# Patient Record
Sex: MALE | Race: WHITE | NOT HISPANIC OR LATINO | Employment: OTHER | URBAN - METROPOLITAN AREA
[De-identification: names, ages, dates, MRNs, and addresses within clinical notes are randomized per-mention and may not be internally consistent; named-entity substitution may affect disease eponyms.]

---

## 2021-06-08 ENCOUNTER — APPOINTMENT (OUTPATIENT)
Dept: GENERAL RADIOLOGY | Facility: HOSPITAL | Age: 21
End: 2021-06-08

## 2021-06-08 ENCOUNTER — HOSPITAL ENCOUNTER (EMERGENCY)
Facility: HOSPITAL | Age: 21
Discharge: HOME | End: 2021-06-08
Admitting: EMERGENCY MEDICINE

## 2021-06-08 VITALS
RESPIRATION RATE: 18 BRPM | BODY MASS INDEX: 33.09 KG/M2 | TEMPERATURE: 99.4 F | WEIGHT: 186.73 LBS | DIASTOLIC BLOOD PRESSURE: 68 MMHG | SYSTOLIC BLOOD PRESSURE: 105 MMHG | HEART RATE: 97 BPM | OXYGEN SATURATION: 98 % | HEIGHT: 63 IN

## 2021-06-08 DIAGNOSIS — R50.9 FEVER AND CHILLS: Primary | ICD-10-CM

## 2021-06-08 DIAGNOSIS — Z20.822 COVID-19 RULED OUT BY LABORATORY TESTING: ICD-10-CM

## 2021-06-08 DIAGNOSIS — B34.9 VIRAL SYNDROME: ICD-10-CM

## 2021-06-08 DIAGNOSIS — R05.9 COUGH: ICD-10-CM

## 2021-06-08 LAB — SARS-COV-2 RNA PNL SPEC NAA+PROBE: NORMAL

## 2021-06-08 PROCEDURE — 99283 EMERGENCY DEPT VISIT LOW MDM: CPT

## 2021-06-08 PROCEDURE — 71045 X-RAY EXAM CHEST 1 VIEW: CPT

## 2021-06-08 PROCEDURE — 87635 SARS-COV-2 COVID-19 AMP PRB: CPT | Performed by: NURSE PRACTITIONER

## 2021-06-08 RX ORDER — ACETAMINOPHEN 500 MG
1000 TABLET ORAL ONCE
Status: COMPLETED | OUTPATIENT
Start: 2021-06-08 | End: 2021-06-08

## 2021-06-08 RX ADMIN — ACETAMINOPHEN 1000 MG: 500 TABLET, FILM COATED ORAL at 03:58

## 2021-06-08 NOTE — DISCHARGE INSTRUCTIONS
Continue to treat Fever with tylenol and motrin    Covid was negative today.     See PCP for recheck in 24-48hours

## 2021-06-08 NOTE — ED PROVIDER NOTES
Subjective   Patient is a 21-year-old Down syndrome patient who is here with his father who states that he had a cough tonight that was nonproductive he denied any shortness of breath or vomiting he said he has been eating and going to the restroom okay he states he is having some gagging with the cough.  The patient reports that he has pain in his throat and headache he has not received the Covid vaccine.  The patient is unable to rate his pain even on the face scale.          Review of Systems   Unable to perform ROS: Other       History reviewed. No pertinent past medical history.    No Known Allergies    History reviewed. No pertinent surgical history.    History reviewed. No pertinent family history.    Social History     Socioeconomic History   • Marital status:      Spouse name: Not on file   • Number of children: Not on file   • Years of education: Not on file   • Highest education level: Not on file           Objective   Physical Exam  Vitals reviewed.   Constitutional:       Appearance: Normal appearance. He is well-developed.   HENT:      Head: Normocephalic and atraumatic.      Right Ear: External ear normal.      Left Ear: External ear normal.      Nose: Nose normal.      Mouth/Throat:      Mouth: Mucous membranes are moist.   Eyes:      Conjunctiva/sclera: Conjunctivae normal.      Pupils: Pupils are equal, round, and reactive to light.   Cardiovascular:      Rate and Rhythm: Regular rhythm. Tachycardia present.      Pulses: Normal pulses.      Heart sounds: Normal heart sounds.   Pulmonary:      Effort: Pulmonary effort is normal.      Breath sounds: Decreased air movement present. Examination of the right-lower field reveals decreased breath sounds. Examination of the left-lower field reveals decreased breath sounds. Decreased breath sounds present. No wheezing or rhonchi.   Abdominal:      General: Bowel sounds are normal.      Palpations: Abdomen is soft.   Musculoskeletal:         General:  "Normal range of motion.      Cervical back: Normal range of motion and neck supple.   Skin:     General: Skin is warm and dry.      Capillary Refill: Capillary refill takes less than 2 seconds.   Neurological:      General: No focal deficit present.      Mental Status: He is alert and oriented to person, place, and time.      GCS: GCS eye subscore is 4. GCS verbal subscore is 5. GCS motor subscore is 6.   Psychiatric:         Mood and Affect: Mood normal.         Behavior: Behavior normal.         Procedures           ED Course      /68   Pulse 92   Temp (!) 101 °F (38.3 °C) (Rectal)   Resp 16   Ht 160 cm (63\")   Wt 84.7 kg (186 lb 11.7 oz)   SpO2 95%   BMI 33.08 kg/m²   Labs Reviewed   COVID-19,ABBOTT IN-HOUSE,NASAL SWAB (NO TRANSPORT MEDIA) 2 HR TAT - Normal    Narrative:     Fact sheet for providers: https://www.fda.gov/media/329824/download     Fact sheet for patients: https://www.fda.gov/media/838899/download    Test performed by PCR.  If inconsistent with clinical signs and symptoms patient should be tested with different authorized molecular test.     Chest xray- negative per DR. Layne report.                                     MDM  Number of Diagnoses or Management Options  Cough  COVID-19 ruled out by laboratory testing  Fever and chills  Viral syndrome  Diagnosis management comments: Patient was found to have a rectal temp of 101 he was treated with Tylenol.  His Covid was negative-he will be treated for viral syndrome his father was advised to continue to to treat the fever to follow-up with primary care in 24 to 48 hours for recheck and return if worse they verbalized understood discharge instructions       Amount and/or Complexity of Data Reviewed  Clinical lab tests: reviewed    Risk of Complications, Morbidity, and/or Mortality  Presenting problems: minimal  Diagnostic procedures: low  Management options: low    Patient Progress  Patient progress: improved      Final diagnoses:   Fever " and chills   Cough   Viral syndrome   COVID-19 ruled out by laboratory testing       ED Disposition  ED Disposition     ED Disposition Condition Comment    Discharge Stable           Bryn Robert MD  [address] [facility] [place] [address]  [phone]      Be seen for recheck in 24-48 hours         Medication List      No changes were made to your prescriptions during this visit.          Dariela Payne, APRN  06/08/21 0359